# Patient Record
Sex: FEMALE
[De-identification: names, ages, dates, MRNs, and addresses within clinical notes are randomized per-mention and may not be internally consistent; named-entity substitution may affect disease eponyms.]

---

## 2021-01-20 PROBLEM — Z00.00 ENCOUNTER FOR PREVENTIVE HEALTH EXAMINATION: Status: ACTIVE | Noted: 2021-01-20

## 2021-01-21 ENCOUNTER — APPOINTMENT (OUTPATIENT)
Dept: SURGERY | Facility: CLINIC | Age: 76
End: 2021-01-21

## 2024-09-12 ENCOUNTER — APPOINTMENT (OUTPATIENT)
Dept: ORTHOPEDIC SURGERY | Facility: CLINIC | Age: 79
End: 2024-09-12
Payer: MEDICARE

## 2024-09-12 VITALS — BODY MASS INDEX: 24.09 KG/M2 | WEIGHT: 141.1 LBS | HEIGHT: 64.17 IN

## 2024-09-12 DIAGNOSIS — M75.42 IMPINGEMENT SYNDROME OF LEFT SHOULDER: ICD-10-CM

## 2024-09-12 DIAGNOSIS — Z87.891 PERSONAL HISTORY OF NICOTINE DEPENDENCE: ICD-10-CM

## 2024-09-12 DIAGNOSIS — M54.12 RADICULOPATHY, CERVICAL REGION: ICD-10-CM

## 2024-09-12 DIAGNOSIS — M75.22 BICIPITAL TENDINITIS, LEFT SHOULDER: ICD-10-CM

## 2024-09-12 DIAGNOSIS — I10 ESSENTIAL (PRIMARY) HYPERTENSION: ICD-10-CM

## 2024-09-12 PROCEDURE — 73010 X-RAY EXAM OF SHOULDER BLADE: CPT | Mod: 50

## 2024-09-12 PROCEDURE — 73030 X-RAY EXAM OF SHOULDER: CPT | Mod: 50

## 2024-09-12 PROCEDURE — 99204 OFFICE O/P NEW MOD 45 MIN: CPT

## 2024-09-12 PROCEDURE — 72040 X-RAY EXAM NECK SPINE 2-3 VW: CPT

## 2024-09-12 RX ORDER — MELOXICAM 7.5 MG/1
7.5 TABLET ORAL
Qty: 30 | Refills: 0 | Status: ACTIVE | COMMUNITY
Start: 2024-09-12 | End: 1900-01-01

## 2024-09-12 NOTE — HISTORY OF PRESENT ILLNESS
[de-identified] : Date of Injury/Onset: August 2024 Pain: At Rest: 5 With Activity: 7 Mechanism of injury: This is NOT a Work Related Injury being treated under Worker's Compensation. This is NOT an athletic injury occurring associated with an interscholastic or organized sports team. Quality of symptoms: Improves with: Worse with: Prior treatment:  Prior Imaging: MRI Reports Available For Review Today: no Out of work/sport: working School/Sport/Position/Occupation: [employer]  09/12/2024 MICHAEL 79 year F is here today for evaluation of B/L shoulder pain. Patient reports injury about 1 month ago, she missed a step and fell down landing on hands. Patient states she had been feeling b/l shoulders & hips pain since 2020 after COVID. Patient had MRI done of C-spine, T-spine and L-spine, no access to it. Patient reports pain is worse when lifting, reports limited ROM. Patient had been doing massages for pain relief. Patient notes pain radiates to b/l hands with some numbness and tingling. Patient reports some soreness and tenderness pain on shoulders.  Patient also reports b/l knees pain, reports some soreness and swelling, pain is localized, pain is worse when bending and walking.

## 2024-09-12 NOTE — DATA REVIEWED
[FreeTextEntry1] : bilateral X-Ray Examination of the SHOULDER (2 views):  no fractures, subluxations or dislocations.   X-Ray Examination of the SCAPULA 1 or 2 views shows: no significant abnormalities  X-Ray Examination of the CERVICAL SPINE 3 views (or less) shows: straightening consistent with spasm and disc space narrowing.

## 2024-09-12 NOTE — DISCUSSION/SUMMARY
[de-identified] : We discussed their diagnosis and treatment options at length. We will first attempt conservative treatment with a course of PT and anti-inflammatory medication. The patient was provided with a prescription to work on scapular strengthening and rotator cuff strengthening on the impingement syndrome protocol. We also discussed the possible of a corticosteroid injection in the future in order to help decrease inflammation and pain so that they can perform better therapy.    Follow up in 6 weeks to re-evaluate progress with therapy  Re Cervical Spine: FU with Dr. Allen/ José Miguel   next visit: if no improvement consider MR of more symptomatic shoulder

## 2024-09-12 NOTE — IMAGING
[de-identified] : NECK:  Inspection: no ecchymosis.   Palpation: trapezial tenderness.   Range of motion:  Full range of motion with mild stiffness . Pain at extremes of rotation to left.   Strength Testing: Weakness with Left Finger Abductors and Grasp  Normal Deltoid, Biceps, Triceps, Wrist Flexors  Neurological testing: light touch is intact throughout both upper extremities  Walker reflex: neg  Spurling test: positive  LEFT SHOULDER  Inspection: No swelling.   Palpation: Tenderness is noted at the bicipital groove, anterior and lateral.   Range of motion: There is pain with range of motion.  , ER 55, @90ER 90, @90IR 30  Strength: There is pain and discomfort with strength testing.  Forward Flexion 4/5. Abduction 4/5.  External Rotation 5-/5 and Internal Rotation 5/5   Neurological testings: motor and sensor intact distally.  Ligament Stability and Special Tests:   There is positive arc of pain.   Shoulder apprehension: neg  Shoulder relocation: neg  Obriens test: pos  Biceps Active test: neg  Ulrich Labral Shear: neg  Impingement testing: pos  Ijeoma testing: pos  Whipple: pos  Cross Body Adduction: neg

## 2024-10-21 ENCOUNTER — APPOINTMENT (OUTPATIENT)
Dept: ORTHOPEDIC SURGERY | Facility: CLINIC | Age: 79
End: 2024-10-21

## 2024-10-21 ENCOUNTER — RESULT CHARGE (OUTPATIENT)
Age: 79
End: 2024-10-21

## 2024-10-21 DIAGNOSIS — M75.22 BICIPITAL TENDINITIS, LEFT SHOULDER: ICD-10-CM

## 2024-10-21 DIAGNOSIS — M75.42 IMPINGEMENT SYNDROME OF LEFT SHOULDER: ICD-10-CM

## 2024-10-21 PROBLEM — M47.816 LUMBAR SPONDYLOSIS: Status: ACTIVE | Noted: 2024-10-21

## 2024-10-21 PROBLEM — M16.10 HIP ARTHRITIS: Status: ACTIVE | Noted: 2024-10-21

## 2024-10-21 PROCEDURE — 72170 X-RAY EXAM OF PELVIS: CPT

## 2024-10-21 PROCEDURE — 72100 X-RAY EXAM L-S SPINE 2/3 VWS: CPT

## 2024-10-21 RX ORDER — MELOXICAM 7.5 MG/1
7.5 TABLET ORAL
Qty: 30 | Refills: 0 | Status: ACTIVE | COMMUNITY
Start: 2024-10-21 | End: 1900-01-01

## 2024-10-30 ENCOUNTER — APPOINTMENT (OUTPATIENT)
Dept: ORTHOPEDIC SURGERY | Facility: CLINIC | Age: 79
End: 2024-10-30
Payer: MEDICARE

## 2024-10-30 DIAGNOSIS — M54.12 RADICULOPATHY, CERVICAL REGION: ICD-10-CM

## 2024-10-30 DIAGNOSIS — M47.816 SPONDYLOSIS W/OUT MYELOPATHY OR RADICULOPATHY, LUMBAR REGION: ICD-10-CM

## 2024-10-30 DIAGNOSIS — M16.10 UNILATERAL PRIMARY OSTEOARTHRITIS, UNSPECIFIED HIP: ICD-10-CM

## 2024-10-30 PROCEDURE — 99203 OFFICE O/P NEW LOW 30 MIN: CPT

## 2024-10-31 ENCOUNTER — APPOINTMENT (OUTPATIENT)
Dept: PAIN MANAGEMENT | Facility: CLINIC | Age: 79
End: 2024-10-31

## 2024-11-18 ENCOUNTER — APPOINTMENT (OUTPATIENT)
Dept: ORTHOPEDIC SURGERY | Facility: CLINIC | Age: 79
End: 2024-11-18
Payer: MEDICARE

## 2024-11-18 DIAGNOSIS — M75.22 BICIPITAL TENDINITIS, LEFT SHOULDER: ICD-10-CM

## 2024-11-18 DIAGNOSIS — M75.42 IMPINGEMENT SYNDROME OF LEFT SHOULDER: ICD-10-CM

## 2024-11-18 PROCEDURE — 99213 OFFICE O/P EST LOW 20 MIN: CPT

## 2024-11-18 RX ORDER — DICLOFENAC SODIUM 25 MG/1
25 TABLET, DELAYED RELEASE ORAL
Qty: 60 | Refills: 0 | Status: ACTIVE | COMMUNITY
Start: 2024-11-18 | End: 1900-01-01

## 2024-11-25 ENCOUNTER — APPOINTMENT (OUTPATIENT)
Dept: PAIN MANAGEMENT | Facility: CLINIC | Age: 79
End: 2024-11-25
Payer: MEDICARE

## 2024-11-25 VITALS — HEIGHT: 64 IN | WEIGHT: 142 LBS | BODY MASS INDEX: 24.24 KG/M2

## 2024-11-25 DIAGNOSIS — M54.12 RADICULOPATHY, CERVICAL REGION: ICD-10-CM

## 2024-11-25 DIAGNOSIS — M47.816 SPONDYLOSIS W/OUT MYELOPATHY OR RADICULOPATHY, LUMBAR REGION: ICD-10-CM

## 2024-11-25 DIAGNOSIS — Z86.39 PERSONAL HISTORY OF OTHER ENDOCRINE, NUTRITIONAL AND METABOLIC DISEASE: ICD-10-CM

## 2024-11-25 DIAGNOSIS — M51.16 INTERVERTEBRAL DISC DISORDERS WITH RADICULOPATHY, LUMBAR REGION: ICD-10-CM

## 2024-11-25 PROCEDURE — 99204 OFFICE O/P NEW MOD 45 MIN: CPT

## 2024-12-02 ENCOUNTER — APPOINTMENT (OUTPATIENT)
Dept: MRI IMAGING | Facility: CLINIC | Age: 79
End: 2024-12-02
Payer: MEDICARE

## 2024-12-02 PROCEDURE — 72148 MRI LUMBAR SPINE W/O DYE: CPT

## 2024-12-23 ENCOUNTER — APPOINTMENT (OUTPATIENT)
Dept: PAIN MANAGEMENT | Facility: CLINIC | Age: 79
End: 2024-12-23
Payer: MEDICARE

## 2024-12-23 VITALS — HEIGHT: 64 IN | BODY MASS INDEX: 24.59 KG/M2 | WEIGHT: 144 LBS

## 2024-12-23 DIAGNOSIS — M54.12 RADICULOPATHY, CERVICAL REGION: ICD-10-CM

## 2024-12-23 DIAGNOSIS — M51.16 INTERVERTEBRAL DISC DISORDERS WITH RADICULOPATHY, LUMBAR REGION: ICD-10-CM

## 2024-12-23 PROCEDURE — 99214 OFFICE O/P EST MOD 30 MIN: CPT

## 2025-01-02 ENCOUNTER — APPOINTMENT (OUTPATIENT)
Dept: ORTHOPEDIC SURGERY | Facility: CLINIC | Age: 80
End: 2025-01-02
Payer: MEDICARE

## 2025-01-02 DIAGNOSIS — M75.22 BICIPITAL TENDINITIS, LEFT SHOULDER: ICD-10-CM

## 2025-01-02 DIAGNOSIS — M75.42 IMPINGEMENT SYNDROME OF LEFT SHOULDER: ICD-10-CM

## 2025-01-02 PROCEDURE — J3490M: CUSTOM | Mod: JZ

## 2025-01-02 PROCEDURE — 99213 OFFICE O/P EST LOW 20 MIN: CPT | Mod: 25

## 2025-01-02 PROCEDURE — 20610 DRAIN/INJ JOINT/BURSA W/O US: CPT | Mod: LT

## 2025-01-14 ENCOUNTER — APPOINTMENT (OUTPATIENT)
Dept: PAIN MANAGEMENT | Facility: CLINIC | Age: 80
End: 2025-01-14
Payer: MEDICARE

## 2025-01-14 DIAGNOSIS — M51.16 INTERVERTEBRAL DISC DISORDERS WITH RADICULOPATHY, LUMBAR REGION: ICD-10-CM

## 2025-01-14 PROCEDURE — 62323 NJX INTERLAMINAR LMBR/SAC: CPT

## 2025-01-27 ENCOUNTER — APPOINTMENT (OUTPATIENT)
Dept: PAIN MANAGEMENT | Facility: CLINIC | Age: 80
End: 2025-01-27
Payer: MEDICARE

## 2025-01-27 VITALS — HEIGHT: 64 IN | WEIGHT: 145 LBS | BODY MASS INDEX: 24.75 KG/M2

## 2025-01-27 DIAGNOSIS — M47.816 SPONDYLOSIS W/OUT MYELOPATHY OR RADICULOPATHY, LUMBAR REGION: ICD-10-CM

## 2025-01-27 DIAGNOSIS — M51.16 INTERVERTEBRAL DISC DISORDERS WITH RADICULOPATHY, LUMBAR REGION: ICD-10-CM

## 2025-01-27 DIAGNOSIS — M46.1 SACROILIITIS, NOT ELSEWHERE CLASSIFIED: ICD-10-CM

## 2025-01-27 PROCEDURE — 99214 OFFICE O/P EST MOD 30 MIN: CPT

## 2025-02-03 ENCOUNTER — APPOINTMENT (OUTPATIENT)
Dept: ORTHOPEDIC SURGERY | Facility: CLINIC | Age: 80
End: 2025-02-03
Payer: MEDICARE

## 2025-02-03 DIAGNOSIS — M75.22 BICIPITAL TENDINITIS, LEFT SHOULDER: ICD-10-CM

## 2025-02-03 DIAGNOSIS — M75.42 IMPINGEMENT SYNDROME OF LEFT SHOULDER: ICD-10-CM

## 2025-02-03 PROCEDURE — 99212 OFFICE O/P EST SF 10 MIN: CPT

## 2025-02-12 ENCOUNTER — APPOINTMENT (OUTPATIENT)
Dept: ORTHOPEDIC SURGERY | Facility: CLINIC | Age: 80
End: 2025-02-12
Payer: MEDICARE

## 2025-02-12 PROCEDURE — 99214 OFFICE O/P EST MOD 30 MIN: CPT

## 2025-02-12 RX ORDER — METHYLPREDNISOLONE 4 MG/1
4 TABLET ORAL
Qty: 1 | Refills: 0 | Status: ACTIVE | COMMUNITY
Start: 2025-02-12 | End: 1900-01-01

## 2025-02-17 ENCOUNTER — APPOINTMENT (OUTPATIENT)
Dept: PAIN MANAGEMENT | Facility: CLINIC | Age: 80
End: 2025-02-17
Payer: MEDICARE

## 2025-02-17 VITALS — HEIGHT: 64 IN | BODY MASS INDEX: 24.59 KG/M2 | WEIGHT: 144 LBS

## 2025-02-17 DIAGNOSIS — M46.1 SACROILIITIS, NOT ELSEWHERE CLASSIFIED: ICD-10-CM

## 2025-02-17 DIAGNOSIS — M47.816 SPONDYLOSIS W/OUT MYELOPATHY OR RADICULOPATHY, LUMBAR REGION: ICD-10-CM

## 2025-02-17 DIAGNOSIS — M54.12 RADICULOPATHY, CERVICAL REGION: ICD-10-CM

## 2025-02-17 DIAGNOSIS — M51.16 INTERVERTEBRAL DISC DISORDERS WITH RADICULOPATHY, LUMBAR REGION: ICD-10-CM

## 2025-02-17 PROCEDURE — 99214 OFFICE O/P EST MOD 30 MIN: CPT

## 2025-07-17 ENCOUNTER — EMERGENCY (EMERGENCY)
Facility: HOSPITAL | Age: 80
LOS: 1 days | End: 2025-07-17
Attending: EMERGENCY MEDICINE
Payer: SELF-PAY

## 2025-07-17 VITALS
HEIGHT: 61 IN | TEMPERATURE: 98 F | HEART RATE: 70 BPM | SYSTOLIC BLOOD PRESSURE: 161 MMHG | OXYGEN SATURATION: 95 % | WEIGHT: 143.96 LBS | RESPIRATION RATE: 18 BRPM | DIASTOLIC BLOOD PRESSURE: 106 MMHG

## 2025-07-17 VITALS
SYSTOLIC BLOOD PRESSURE: 144 MMHG | OXYGEN SATURATION: 99 % | TEMPERATURE: 98 F | HEART RATE: 75 BPM | DIASTOLIC BLOOD PRESSURE: 78 MMHG | RESPIRATION RATE: 17 BRPM

## 2025-07-17 PROCEDURE — 73090 X-RAY EXAM OF FOREARM: CPT

## 2025-07-17 PROCEDURE — 96376 TX/PRO/DX INJ SAME DRUG ADON: CPT

## 2025-07-17 PROCEDURE — 90715 TDAP VACCINE 7 YRS/> IM: CPT

## 2025-07-17 PROCEDURE — 73060 X-RAY EXAM OF HUMERUS: CPT | Mod: 26,RT

## 2025-07-17 PROCEDURE — 73030 X-RAY EXAM OF SHOULDER: CPT

## 2025-07-17 PROCEDURE — 96375 TX/PRO/DX INJ NEW DRUG ADDON: CPT

## 2025-07-17 PROCEDURE — 73090 X-RAY EXAM OF FOREARM: CPT | Mod: 26,LT

## 2025-07-17 PROCEDURE — 70450 CT HEAD/BRAIN W/O DYE: CPT | Mod: 26

## 2025-07-17 PROCEDURE — 73060 X-RAY EXAM OF HUMERUS: CPT

## 2025-07-17 PROCEDURE — 73030 X-RAY EXAM OF SHOULDER: CPT | Mod: 26,RT

## 2025-07-17 PROCEDURE — 99284 EMERGENCY DEPT VISIT MOD MDM: CPT | Mod: 25

## 2025-07-17 PROCEDURE — 73060 X-RAY EXAM OF HUMERUS: CPT | Mod: 26,RT,77

## 2025-07-17 PROCEDURE — 73080 X-RAY EXAM OF ELBOW: CPT

## 2025-07-17 PROCEDURE — 96374 THER/PROPH/DIAG INJ IV PUSH: CPT

## 2025-07-17 PROCEDURE — 70450 CT HEAD/BRAIN W/O DYE: CPT

## 2025-07-17 PROCEDURE — 73080 X-RAY EXAM OF ELBOW: CPT | Mod: 26,RT

## 2025-07-17 PROCEDURE — 99285 EMERGENCY DEPT VISIT HI MDM: CPT

## 2025-07-17 PROCEDURE — 90471 IMMUNIZATION ADMIN: CPT

## 2025-07-17 PROCEDURE — 73110 X-RAY EXAM OF WRIST: CPT | Mod: 26,RT

## 2025-07-17 PROCEDURE — 73110 X-RAY EXAM OF WRIST: CPT

## 2025-07-17 RX ORDER — OXYCODONE HYDROCHLORIDE 30 MG/1
1 TABLET ORAL
Qty: 4 | Refills: 0
Start: 2025-07-17 | End: 2025-07-19

## 2025-07-17 RX ORDER — OXYCODONE HYDROCHLORIDE 30 MG/1
5 TABLET ORAL ONCE
Refills: 0 | Status: DISCONTINUED | OUTPATIENT
Start: 2025-07-17 | End: 2025-07-17

## 2025-07-17 RX ORDER — KETOROLAC TROMETHAMINE 30 MG/ML
15 INJECTION, SOLUTION INTRAMUSCULAR; INTRAVENOUS ONCE
Refills: 0 | Status: DISCONTINUED | OUTPATIENT
Start: 2025-07-17 | End: 2025-07-17

## 2025-07-17 RX ORDER — ACETAMINOPHEN 500 MG/5ML
1000 LIQUID (ML) ORAL ONCE
Refills: 0 | Status: COMPLETED | OUTPATIENT
Start: 2025-07-17 | End: 2025-07-17

## 2025-07-17 RX ADMIN — Medication 1000 MILLILITER(S): at 16:05

## 2025-07-17 RX ADMIN — Medication 2 MILLIGRAM(S): at 11:17

## 2025-07-17 RX ADMIN — OXYCODONE HYDROCHLORIDE 5 MILLIGRAM(S): 30 TABLET ORAL at 16:05

## 2025-07-17 RX ADMIN — KETOROLAC TROMETHAMINE 15 MILLIGRAM(S): 30 INJECTION, SOLUTION INTRAMUSCULAR; INTRAVENOUS at 11:17

## 2025-07-17 RX ADMIN — Medication 4 MILLIGRAM(S): at 15:06

## 2025-07-17 RX ADMIN — Medication 400 MILLIGRAM(S): at 11:17

## 2025-07-18 ENCOUNTER — EMERGENCY (EMERGENCY)
Facility: HOSPITAL | Age: 80
LOS: 1 days | End: 2025-07-18
Attending: STUDENT IN AN ORGANIZED HEALTH CARE EDUCATION/TRAINING PROGRAM
Payer: OTHER MISCELLANEOUS

## 2025-07-18 VITALS
TEMPERATURE: 98 F | HEIGHT: 61 IN | DIASTOLIC BLOOD PRESSURE: 70 MMHG | SYSTOLIC BLOOD PRESSURE: 118 MMHG | OXYGEN SATURATION: 95 % | HEART RATE: 78 BPM | RESPIRATION RATE: 17 BRPM | WEIGHT: 143.96 LBS

## 2025-07-18 PROCEDURE — 99284 EMERGENCY DEPT VISIT MOD MDM: CPT

## 2025-07-18 NOTE — ED PROVIDER NOTE - PROGRESS NOTE DETAILS
NP Adela: Ortho came to bedside to apply new splint to patient's arm.  Per Ortho patient is cleared and ready for discharge, please have patient follow-up with Ji Pathak in 1 week.  Patient provided strict return precautions, provided time to ask questions which were answered at the bedside by me.  Patient is ready for discharge home. referred patient to ortho for 1 week f/u. discussed patient home meds and no interaction w/ oxycodone. discussed to not take ibuprofen w/ meloxicam or advil. discussed strict return precautions. patient and son agreeable to plan

## 2025-07-18 NOTE — ED ADULT TRIAGE NOTE - NS ED TRIAGE AVPU SCALE
Updating BP     Last Blood Pressure: 158/80  Last Heart Rate: 60  Date: 2/27/23  Location: Other Specialty      3/21/23 Blood Pressure: 136/75  Heart Rate: n/a   Location: Home BP    Patient reported blood pressure updated in Epic. Blood pressure falls within [place] Community Measures guidelines.  Patient will follow up as previously advised.   EMILY Hinton     
Alert-The patient is alert, awake and responds to voice. The patient is oriented to time, place, and person. The triage nurse is able to obtain subjective information.

## 2025-07-18 NOTE — ED PROVIDER NOTE - ATTENDING APP SHARED VISIT CONTRIBUTION OF CARE
79-year-old female presents the ER as callback requesting to be seen by orthopedic doctors, patient recently seen in the ED by myself for fall found to have humeral fracture which was splinted in the ER by orthopedic team.  Upon review of imaging patient was found to have radial head fracture as well as humerus fracture. cap refill within normal limits, no decreased sensation, cast in place will discuss with orthopedic team for resplinting, multimodal pain medication.  Dispo pending workup    I performed a history and physical exam of the patient and discussed their management with the resident. I reviewed the acp/ resident's note and agree with the documented findings and plan of care, except as noted.. My medical decision making and observations are found above. I agree with the documentation and assessment as made by the NP. We have discussed plan of care and work up for the patient.   This was a shared visit with the NP. I independently reviewed and verified the documentation and directly supervised and/or performed the documented:   History, Exam and Medical Decision Making.     79-year-old female presents the ER as callback requesting to be seen by orthopedic doctors, patient recently seen in the ED for fall found to have humeral fracture which was splinted in the ER by orthopedic team.  Upon review of imaging patient was found to have radial head fracture as well as humerus fracture. cap refill within normal limits, no decreased sensation, cast in place will discuss with orthopedic team for resplinting, multimodal pain medication.  Dispo pending workup. - Kuldip Luke MD. EM Attending

## 2025-07-18 NOTE — ED PROVIDER NOTE - CLINICAL SUMMARY MEDICAL DECISION MAKING FREE TEXT BOX
EVY Chapman: Patient is 79-year-old female presents to the ER as callback to see orthopedic doctors.  Per report patient was seen here yesterday after a fall, was found to have a humeral fracture, patient was splinted here in ER by the orthopedic team, upon review of x-rays last night patient was also found to have a radial head fracture, Ortho states by the time they saw this patient was already gone, they called patient and she said she would come back today.  Patient denies new complaints.  Patient is well-appearing, nontoxic.  PE as above pertinent for RUE with splint in place from elbow to shoulder, radial pulse 2+, compartments soft, capillary refill < 2 seconds.  Patient likely with fracture requiring splinting by orthopedic team.  Orthopedic team called, to come to bedside to evaluate and splint patient.  Disposition pending reassessment and orthopedic recommendations.

## 2025-07-18 NOTE — ED PROVIDER NOTE - PHYSICAL EXAMINATION
CONSTITUTIONAL: A&O x3, NAD, resting comfortably, well groomed  HEAD: Normocephalic, atraumatic.   NECK:  Airway patent. Neck Supple.    CARDIAC: RRR, normal S1/2, no murmurs, rubs, gallops. CW non-TTP, no CW deformity.  RESPIRATORY: breathing unlabored.   MSK: RUE with splint in place from elbow to shoulder, radial pulse 2+, compartments soft, capillary refill < 2 seconds.   SKIN: Warm, dry, color WNL, no turgor, erythema or rashes. Cap refill < 2 sec.  NEURO: A&Ox3, interactive, cooperative, no focal deficits.

## 2025-07-18 NOTE — ED PROVIDER NOTE - CARE PROVIDER_API CALL
Ji Pathak)  Orthopaedic Surgery  [address] [place] [address], [address] [place] [address]  Phone: [phone]  Fax: [phone]  Follow Up Time: 4-6 Days

## 2025-07-18 NOTE — ED PROVIDER NOTE - NSFOLLOWUPINSTRUCTIONS_ED_ALL_ED_FT
- You were seen in the ER today for fracture requiring splinting by the orthopedic team.  The splint that was placed by the orthopedic team cannot get wet, please keep the splint in place until you follow-up with the orthopedic doctors next week.    - Take Motrin 600 mg every 8 hours as needed for moderate pain-- take with food..    - Take Tylenol 650mg (Two 325 mg pills) every 4-6 hours as needed for pain.    - You can take oxycodone which is previously prescribed for you every 6-8 hours as needed for severe pain.  Please do not drink alcohol or drive on this medication as it may cause drowsiness.    - Rest, Ice, Elevate injured area    - Advance activity as tolerated.     - Continue all previously prescribed medications as directed unless otherwise instructed.     - Follow-up with Orthopedics Dr Ji Pathak Next week as discussed, Contact information will be provided below for you.    -Return to Emergency room for any worsening or persistent pain, weakness, numbness, fever, color change to extremity, or any other concerning symptoms.

## 2025-07-18 NOTE — ED ADULT NURSE NOTE - NSFALLUNIVINTERV_ED_ALL_ED
Bed/Stretcher in lowest position, wheels locked, appropriate side rails in place/Call bell, personal items and telephone in reach/Instruct patient to call for assistance before getting out of bed/chair/stretcher/Non-slip footwear applied when patient is off stretcher/[place] to call system/Physically safe environment - no spills, clutter or unnecessary equipment/Purposeful proactive rounding/Room/bathroom lighting operational, light cord in reach

## 2025-07-18 NOTE — ED PROVIDER NOTE - PATIENT PORTAL LINK FT
You can access the FollowMyHealth Patient Portal offered by [facility] by registering at the following website: [email or web address]. By joining [employer]’s FollowMyHealth portal, you will also be able to view your health information using other applications (apps) compatible with our system.

## 2025-07-23 ENCOUNTER — EMERGENCY (EMERGENCY)
Facility: HOSPITAL | Age: 80
LOS: 1 days | End: 2025-07-23
Attending: STUDENT IN AN ORGANIZED HEALTH CARE EDUCATION/TRAINING PROGRAM
Payer: MEDICARE

## 2025-07-23 VITALS
WEIGHT: 149.91 LBS | OXYGEN SATURATION: 97 % | HEART RATE: 80 BPM | RESPIRATION RATE: 17 BRPM | SYSTOLIC BLOOD PRESSURE: 113 MMHG | DIASTOLIC BLOOD PRESSURE: 71 MMHG | HEIGHT: 61 IN | TEMPERATURE: 98 F

## 2025-07-23 PROCEDURE — 99284 EMERGENCY DEPT VISIT MOD MDM: CPT

## 2025-07-23 PROCEDURE — 99283 EMERGENCY DEPT VISIT LOW MDM: CPT | Mod: 25

## 2025-07-23 PROCEDURE — 73060 X-RAY EXAM OF HUMERUS: CPT | Mod: 26,RT

## 2025-07-23 PROCEDURE — 73060 X-RAY EXAM OF HUMERUS: CPT

## 2025-07-23 RX ORDER — OXYCODONE HYDROCHLORIDE AND ACETAMINOPHEN 10; 325 MG/1; MG/1
1 TABLET ORAL ONCE
Refills: 0 | Status: DISCONTINUED | OUTPATIENT
Start: 2025-07-23 | End: 2025-07-23

## 2025-07-23 RX ORDER — OXYCODONE HYDROCHLORIDE 30 MG/1
1 TABLET ORAL
Qty: 8 | Refills: 0
Start: 2025-07-23 | End: 2025-07-24

## 2025-07-23 RX ORDER — DOCUSATE SODIUM 100 MG
1 CAPSULE ORAL
Qty: 10 | Refills: 0
Start: 2025-07-23 | End: 2025-07-27

## 2025-07-23 RX ADMIN — OXYCODONE HYDROCHLORIDE AND ACETAMINOPHEN 1 TABLET(S): 10; 325 TABLET ORAL at 13:24

## 2025-07-23 RX ADMIN — OXYCODONE HYDROCHLORIDE AND ACETAMINOPHEN 1 TABLET(S): 10; 325 TABLET ORAL at 14:00

## 2025-07-23 NOTE — ED PROVIDER NOTE - OBJECTIVE STATEMENT
80-year-old female, recent history of a right proximal humerus, midshaft humerus  and distal radius fracture evaluated at [facility] ER.  Was evaluated in the emergency room 5 days ago and had a coaptation splint applied and eventually called back to return to the ER for sugar-tong splint.   The time was eval by Ortho as well. Patient was prescribed a few tablets of oxycodone once taking ibuprofen/Tylenol.  Patient presents today with son for appointment with trauma surgeon Dr. Morocho.  Reports pain is continuous but no increase in pain.  Reports at times feeling slight numbness sensation all fingers.  No other complaints.

## 2025-07-23 NOTE — ED PROVIDER NOTE - CLINICAL SUMMARY MEDICAL DECISION MAKING FREE TEXT BOX
80-year-old female, presents for evaluation of persistent pain and inability to follow-up with the surgeon.  During my exam and during the orthopedic PA team exam the upper extremity is neurovascularly intact.  No signs of open fracture, compartment syndrome or infection.  Splint reapplied by the orthopedic team.   Plan for outpatient trauma Ortho follow-up.

## 2025-07-23 NOTE — ED PROVIDER NOTE - ATTENDING APP SHARED VISIT CONTRIBUTION OF CARE
80-year-old female recent history of right proximal humerus, midshaft humerus, distal radius fracture presents with persistent pain, inability to follow-up with orthopedic as outpatient.  Patient was initially seen in the emergency department by myself 5 days ago, had a coaptation splint applied by orthopedics, was called back later and placed in a sugar-tong splint.  Patient presents today with increased pain, inability follow-up with orthopedics.  Physical exam positive peripheral pulses, cap refill less than 2 seconds, sensation to all fingers, mild ecchymosis to upper extremity.  Ortho consulted for reevaluation, ordered rpt XR, Ortho resplinted patient, discussed return precautions, will give patient follow-up with Ortho trauma as outpatient.  Reviewed patient's chronic medication list with patient, no interaction with oxy, Tylenol.  Patient on meloxicam, discussed with patient need to not take meloxicam if taking ibuprofen and Motrin.    I performed a history and physical exam of the patient and discussed their management with the resident. I reviewed the acp/ resident's note and agree with the documented findings and plan of care, except as noted.. My medical decision making and observations are found above.

## 2025-07-23 NOTE — ED ADULT NURSE NOTE - OBJECTIVE STATEMENT
pt from home c/o Rt forearm, Rt wrist pain s/p fall on 7/17 and  Rt arm cast placement, Rt arm cast intact

## 2025-07-23 NOTE — ED PROVIDER NOTE - PHYSICAL EXAMINATION
Both splints removed for exam: Mild ecchymosis to the upper extremity.  No skin break.  Radial/ulnar pulses intact 2+ bilaterally.  Cap refill less than 2 seconds all fingers.  No sensory deficit on exam.  Able to dorsiflex the wrist, abduct the fingers and appose index to thumb.

## 2025-07-23 NOTE — ED ADULT NURSE NOTE - NSFALLRISKINTERV_ED_ALL_ED

## 2025-07-23 NOTE — ED PROVIDER NOTE - PROVIDER TOKENS
PROVIDER:[TOKEN:[[id number]]] PROVIDER:[TOKEN:[[id number]]],PROVIDER:[TOKEN:[[id number]]],PROVIDER:[TOKEN:[[id number]]],PROVIDER:[TOKEN:[[id number]]]

## 2025-07-23 NOTE — ED PROVIDER NOTE - NSFOLLOWUPINSTRUCTIONS_ED_ALL_ED_FT
Follow up with the trauma orthopedist within 1 week.  If you experience any new or worsening symptoms or if you are concerned you can always come back to the emergency for a re-evaluation.  Some results may not be available at the time of your discharge from the hospital. You can download the FOLLOW MY HEALTH jamarcus and have access to these results.  If there were any prescriptions given to you during the visit today take them as prescribed. If you have any questions you can ask the pharmacist.

## 2025-07-23 NOTE — ED PROVIDER NOTE - CARE PROVIDER_API CALL
Edenilson Arana)  Orthopaedic Surgery  [address], [address]  [place] [address]  Phone: [phone]  Fax: [phone]  Follow Up Time:    Edenilson Arana ()  Orthopaedic Surgery  [address], [address]  [place] [address]  Phone: [phone]  Fax: [phone]  Follow Up Time:     Agustina Rocha ()  Orthopaedic Surgery  [address], [address]  [place] [address]  Phone: [phone]  Fax: [phone]  Follow Up Time:     Eddy Barraza)  Orthopaedic Trauma  [address], [address]  [place] [address]  Phone: [phone]  Fax: [phone]  Follow Up Time:     Aleksey Short)  Orthopaedic Surgery  [address], [address]  [place] [address]  Phone: [phone]  Fax: [phone]  Follow Up Time:

## 2025-07-23 NOTE — ED ADULT NURSE NOTE - CAS DISCH TRANSFER METHOD
Cardiopulmonary Rehab:      Chart reviewed. Pt is on CHF Bundle List      Pt is a 81 y.o. female admitted with Acute respiratory failure with hypoxia. PMHx of HTN, PAD, COPD, GERD, TIA, anxiety. Former smoker. LVEF 55-60%. Pt last visited for CHF teaching [facility] cardiac rehab nurse at recent hospitalization at 5/8/18. Met with pt sitting up in chair, legs elevated. No family at bedside. Pt visited. This was a follow-up visit to answer questions and reinforce prior teaching re: CHF, S&Ss, medication management, Low NA diet, daily weights and when to call the doctor. Pr resides in [facility]. Reports the staff weighs her occasionally. Pt correctly identified rationale for daily weights and reports they do it when they get a chance. Reminded to self advocate. Pt correctly identified SOB and swelling as symptoms of fluid overload. Reminded her to let the staff at the SNF know if she feels more fatigued than usual and/or has difficulty laying down flat. Private car

## 2025-07-23 NOTE — CHART NOTE - NSCHARTNOTEFT_GEN_A_CORE
Patient initially seen one week ago 7/17 s/p fall with RUE fracture; proximal humerus, humeral shaft, and distal radius  Patient was seen and placed in a coaptation splint and sling.  Patient then returned to the ED the following day for pain and splint evaluation , patient was then placed in posterior long arm splint with sugar tong  Patient was told to follow up with Dr Pathak in office trauma specialist  Patient returned to the ED today with her son due to being unable to make appointment for follow up  Patient splint changed today to new posterior long arm splint     >  post-procedure patient had decreased pain and goo dpuses 2+ and CR <2 , sensation and motor intact completely  Patient to follow up with Dr. Jones or Dr. Short trauma specialists as Dr. Pathak is not taking patients at this time  Explained in detail to patient and her son they need to follow up with trauma specialist for possible surgical intervention for RUE fractures  Patient is orthopedically stable for discharge

## 2025-07-23 NOTE — ED PROVIDER NOTE - CARE PLAN
1 Principal Discharge DX:	Closed fracture of right humerus  Secondary Diagnosis:	Closed right radial fracture

## 2025-07-23 NOTE — ED PROVIDER NOTE - CARE PROVIDERS DIRECT ADDRESSES
,[email or web address]

## 2025-07-23 NOTE — ED PROVIDER NOTE - PATIENT PORTAL LINK FT
You can access the FollowMyHealth Patient Portal offered by [facility] by registering at the following website: [email or web address]. By joining [employer]’s FollowMyHealth portal, you will also be able to view your health information using other applications (apps) compatible with our system.

## 2025-07-23 NOTE — ED ADULT TRIAGE NOTE - CHIEF COMPLAINT QUOTE
reports constant pain to right forearm and right wrist s/p trip and fall 7/17. Patient was diagnosed with fracture and placed in a cast.

## 2025-07-30 ENCOUNTER — NON-APPOINTMENT (OUTPATIENT)
Age: 80
End: 2025-07-30

## 2025-07-31 ENCOUNTER — APPOINTMENT (OUTPATIENT)
Dept: ORTHOPEDIC SURGERY | Facility: CLINIC | Age: 80
End: 2025-07-31

## 2025-07-31 VITALS — BODY MASS INDEX: 25.52 KG/M2 | HEIGHT: 63 IN | WEIGHT: 144 LBS

## 2025-07-31 PROCEDURE — 99204 OFFICE O/P NEW MOD 45 MIN: CPT

## 2025-07-31 PROCEDURE — 25600 CLTX DST RDL FX/EPHYS SEP WO: CPT | Mod: RT

## 2025-07-31 PROCEDURE — 24500 CLTX HUMRL SHFT FX W/O MNPJ: CPT | Mod: RT

## 2025-07-31 PROCEDURE — 23600 CLTX PROX HUMRL FX W/O MNPJ: CPT | Mod: RT

## 2025-07-31 RX ORDER — MELOXICAM 7.5 MG/1
7.5 TABLET ORAL
Qty: 30 | Refills: 2 | Status: ACTIVE | COMMUNITY
Start: 2025-07-31 | End: 1900-01-01

## 2025-08-07 ENCOUNTER — APPOINTMENT (OUTPATIENT)
Dept: ORTHOPEDIC SURGERY | Facility: CLINIC | Age: 80
End: 2025-08-07

## 2025-08-08 ENCOUNTER — OUTPATIENT (OUTPATIENT)
Dept: OUTPATIENT SERVICES | Facility: HOSPITAL | Age: 80
LOS: 1 days | End: 2025-08-08
Payer: MEDICARE

## 2025-08-08 ENCOUNTER — RESULT REVIEW (OUTPATIENT)
Age: 80
End: 2025-08-08

## 2025-08-08 VITALS
DIASTOLIC BLOOD PRESSURE: 79 MMHG | HEART RATE: 87 BPM | OXYGEN SATURATION: 97 % | WEIGHT: 141.98 LBS | RESPIRATION RATE: 15 BRPM | TEMPERATURE: 98 F | HEIGHT: 62.6 IN | SYSTOLIC BLOOD PRESSURE: 120 MMHG

## 2025-08-08 DIAGNOSIS — S42.309A UNSPECIFIED FRACTURE OF SHAFT OF HUMERUS, UNSPECIFIED ARM, INITIAL ENCOUNTER FOR CLOSED FRACTURE: ICD-10-CM

## 2025-08-08 DIAGNOSIS — Z98.890 OTHER SPECIFIED POSTPROCEDURAL STATES: Chronic | ICD-10-CM

## 2025-08-08 DIAGNOSIS — Z98.49 CATARACT EXTRACTION STATUS, UNSPECIFIED EYE: Chronic | ICD-10-CM

## 2025-08-08 DIAGNOSIS — S52.501A UNSPECIFIED FRACTURE OF THE LOWER END OF RIGHT RADIUS, INITIAL ENCOUNTER FOR CLOSED FRACTURE: ICD-10-CM

## 2025-08-08 DIAGNOSIS — S42.401A UNSPECIFIED FRACTURE OF LOWER END OF RIGHT HUMERUS, INITIAL ENCOUNTER FOR CLOSED FRACTURE: ICD-10-CM

## 2025-08-08 DIAGNOSIS — S42.201A UNSPECIFIED FRACTURE OF UPPER END OF RIGHT HUMERUS, INITIAL ENCOUNTER FOR CLOSED FRACTURE: ICD-10-CM

## 2025-08-08 DIAGNOSIS — Z01.818 ENCOUNTER FOR OTHER PREPROCEDURAL EXAMINATION: ICD-10-CM

## 2025-08-08 LAB
ANION GAP SERPL CALC-SCNC: 16 MMOL/L — SIGNIFICANT CHANGE UP (ref 5–17)
BUN SERPL-MCNC: 16 MG/DL — SIGNIFICANT CHANGE UP (ref 7–23)
CALCIUM SERPL-MCNC: 10.2 MG/DL — SIGNIFICANT CHANGE UP (ref 8.4–10.5)
CHLORIDE SERPL-SCNC: 102 MMOL/L — SIGNIFICANT CHANGE UP (ref 96–108)
CO2 SERPL-SCNC: 22 MMOL/L — SIGNIFICANT CHANGE UP (ref 22–31)
CREAT SERPL-MCNC: 1.02 MG/DL — SIGNIFICANT CHANGE UP (ref 0.5–1.3)
EGFR: 56 ML/MIN/1.73M2 — LOW
EGFR: 56 ML/MIN/1.73M2 — LOW
GLUCOSE SERPL-MCNC: 106 MG/DL — HIGH (ref 70–99)
HCT VFR BLD CALC: 42.6 % — SIGNIFICANT CHANGE UP (ref 34.5–45)
HGB BLD-MCNC: 14 G/DL — SIGNIFICANT CHANGE UP (ref 11.5–15.5)
MCHC RBC-ENTMCNC: 30.2 PG — SIGNIFICANT CHANGE UP (ref 27–34)
MCHC RBC-ENTMCNC: 32.9 G/DL — SIGNIFICANT CHANGE UP (ref 32–36)
MCV RBC AUTO: 92 FL — SIGNIFICANT CHANGE UP (ref 80–100)
NRBC # BLD AUTO: 0 K/UL — SIGNIFICANT CHANGE UP (ref 0–0)
NRBC # FLD: 0 K/UL — SIGNIFICANT CHANGE UP (ref 0–0)
NRBC BLD AUTO-RTO: 0 /100 WBCS — SIGNIFICANT CHANGE UP (ref 0–0)
PLATELET # BLD AUTO: 410 K/UL — HIGH (ref 150–400)
PMV BLD: 10.5 FL — SIGNIFICANT CHANGE UP (ref 7–13)
POTASSIUM SERPL-MCNC: 3.8 MMOL/L — SIGNIFICANT CHANGE UP (ref 3.5–5.3)
POTASSIUM SERPL-SCNC: 3.8 MMOL/L — SIGNIFICANT CHANGE UP (ref 3.5–5.3)
RBC # BLD: 4.63 M/UL — SIGNIFICANT CHANGE UP (ref 3.8–5.2)
RBC # FLD: 14.6 % — HIGH (ref 10.3–14.5)
SODIUM SERPL-SCNC: 140 MMOL/L — SIGNIFICANT CHANGE UP (ref 135–145)
WBC # BLD: 8.34 K/UL — SIGNIFICANT CHANGE UP (ref 3.8–10.5)
WBC # FLD AUTO: 8.34 K/UL — SIGNIFICANT CHANGE UP (ref 3.8–10.5)

## 2025-08-08 PROCEDURE — 80048 BASIC METABOLIC PNL TOTAL CA: CPT

## 2025-08-08 PROCEDURE — G0463: CPT

## 2025-08-08 PROCEDURE — 73200 CT UPPER EXTREMITY W/O DYE: CPT | Mod: 26,RT

## 2025-08-08 PROCEDURE — 76377 3D RENDER W/INTRP POSTPROCES: CPT | Mod: 26

## 2025-08-08 PROCEDURE — 76377 3D RENDER W/INTRP POSTPROCES: CPT

## 2025-08-08 PROCEDURE — 85027 COMPLETE CBC AUTOMATED: CPT

## 2025-08-08 PROCEDURE — 73200 CT UPPER EXTREMITY W/O DYE: CPT

## 2025-08-12 ENCOUNTER — OUTPATIENT (OUTPATIENT)
Dept: INPATIENT UNIT | Facility: HOSPITAL | Age: 80
LOS: 1 days | End: 2025-08-12
Payer: MEDICARE

## 2025-08-12 ENCOUNTER — APPOINTMENT (OUTPATIENT)
Dept: ORTHOPEDIC SURGERY | Facility: HOSPITAL | Age: 80
End: 2025-08-12

## 2025-08-12 ENCOUNTER — TRANSCRIPTION ENCOUNTER (OUTPATIENT)
Age: 80
End: 2025-08-12

## 2025-08-12 VITALS
RESPIRATION RATE: 16 BRPM | HEIGHT: 62.6 IN | TEMPERATURE: 98 F | SYSTOLIC BLOOD PRESSURE: 144 MMHG | WEIGHT: 141.98 LBS | DIASTOLIC BLOOD PRESSURE: 79 MMHG | HEART RATE: 66 BPM | OXYGEN SATURATION: 95 %

## 2025-08-12 VITALS
HEART RATE: 77 BPM | OXYGEN SATURATION: 100 % | SYSTOLIC BLOOD PRESSURE: 136 MMHG | DIASTOLIC BLOOD PRESSURE: 62 MMHG | RESPIRATION RATE: 15 BRPM

## 2025-08-12 DIAGNOSIS — S42.201A UNSPECIFIED FRACTURE OF UPPER END OF RIGHT HUMERUS, INITIAL ENCOUNTER FOR CLOSED FRACTURE: ICD-10-CM

## 2025-08-12 DIAGNOSIS — Z98.890 OTHER SPECIFIED POSTPROCEDURAL STATES: Chronic | ICD-10-CM

## 2025-08-12 DIAGNOSIS — Z98.49 CATARACT EXTRACTION STATUS, UNSPECIFIED EYE: Chronic | ICD-10-CM

## 2025-08-12 DIAGNOSIS — S52.501A UNSPECIFIED FRACTURE OF THE LOWER END OF RIGHT RADIUS, INITIAL ENCOUNTER FOR CLOSED FRACTURE: ICD-10-CM

## 2025-08-12 DIAGNOSIS — S42.401A UNSPECIFIED FRACTURE OF LOWER END OF RIGHT HUMERUS, INITIAL ENCOUNTER FOR CLOSED FRACTURE: ICD-10-CM

## 2025-08-12 LAB — GLUCOSE BLDC GLUCOMTR-MCNC: 200 MG/DL — HIGH (ref 70–99)

## 2025-08-12 PROCEDURE — 82962 GLUCOSE BLOOD TEST: CPT

## 2025-08-12 PROCEDURE — C1713: CPT

## 2025-08-12 PROCEDURE — C9399: CPT

## 2025-08-12 PROCEDURE — 24515 OPTX HUMRL SHFT FX PLATE/SCR: CPT | Mod: RT

## 2025-08-12 PROCEDURE — 76000 FLUOROSCOPY <1 HR PHYS/QHP: CPT

## 2025-08-12 PROCEDURE — 23615 OPTX PROX HUMRL FX W/INT FIX: CPT | Mod: RT

## 2025-08-12 PROCEDURE — 25607 OPTX DST RD XARTC FX/EPI SEP: CPT | Mod: RT

## 2025-08-12 DEVICE — SCREW SLFTP METAPHYSEAL 2.7X40MM: Type: IMPLANTABLE DEVICE | Site: RIGHT | Status: FUNCTIONAL

## 2025-08-12 DEVICE — SCREW LOKG SLF-TPNG W/ STARDRIVE RECESS 3.5X42MM: Type: IMPLANTABLE DEVICE | Site: RIGHT | Status: FUNCTIONAL

## 2025-08-12 DEVICE — SCREW CORT S-T 3.5X22MM: Type: IMPLANTABLE DEVICE | Site: RIGHT | Status: FUNCTIONAL

## 2025-08-12 DEVICE — SCREW LOKG SLF-TPNG W/ STARDRIVE RECESS 3.5X38MM: Type: IMPLANTABLE DEVICE | Site: RIGHT | Status: FUNCTIONAL

## 2025-08-12 DEVICE — SCREW CORT 2.5X15MM: Type: IMPLANTABLE DEVICE | Site: RIGHT | Status: FUNCTIONAL

## 2025-08-12 DEVICE — SCREW CORT 2.5X14MM: Type: IMPLANTABLE DEVICE | Site: RIGHT | Status: FUNCTIONAL

## 2025-08-12 DEVICE — IMPLANTABLE DEVICE: Type: IMPLANTABLE DEVICE | Site: RIGHT | Status: FUNCTIONAL

## 2025-08-12 DEVICE — WIRE OLIVE 1.6MM 10X60MM: Type: IMPLANTABLE DEVICE | Site: RIGHT | Status: FUNCTIONAL

## 2025-08-12 DEVICE — SCREW LKG VA SLF TAP W/ T8 STARDRIVE 2.7X44MM: Type: IMPLANTABLE DEVICE | Site: RIGHT | Status: FUNCTIONAL

## 2025-08-12 DEVICE — SCREW CORT 2.5X20MM: Type: IMPLANTABLE DEVICE | Site: RIGHT | Status: FUNCTIONAL

## 2025-08-12 DEVICE — SCREW TRILOCK 2.5X18MM: Type: IMPLANTABLE DEVICE | Site: RIGHT | Status: FUNCTIONAL

## 2025-08-12 DEVICE — SCR LOK S-T 3.5X28MM SS: Type: IMPLANTABLE DEVICE | Site: RIGHT | Status: FUNCTIONAL

## 2025-08-12 DEVICE — PLATE LCP 10H 3.5X137MM: Type: IMPLANTABLE DEVICE | Site: RIGHT | Status: FUNCTIONAL

## 2025-08-12 DEVICE — SCREW LOKG SLF-TPNG W/ STARDRIVE RECESS 3.5X26MM: Type: IMPLANTABLE DEVICE | Site: RIGHT | Status: FUNCTIONAL

## 2025-08-12 DEVICE — SCREW TRILOCK 2.5X20MM: Type: IMPLANTABLE DEVICE | Site: RIGHT | Status: FUNCTIONAL

## 2025-08-12 DEVICE — SCREW CORT S-T 3.5X24MM: Type: IMPLANTABLE DEVICE | Site: RIGHT | Status: FUNCTIONAL

## 2025-08-12 DEVICE — SCREW LKG VA SLF TAP W/ T8 STARDRIVE 2.7X46MM: Type: IMPLANTABLE DEVICE | Site: RIGHT | Status: FUNCTIONAL

## 2025-08-12 DEVICE — K-WIRE MEDARTIS (SMOOTH) SINGLE TROCAR 1.6MM X 150MM: Type: IMPLANTABLE DEVICE | Site: RIGHT | Status: FUNCTIONAL

## 2025-08-12 DEVICE — SCREW LOCKING SLF-TPNG W/ STARDRIVE RECESS 3.5X40MM: Type: IMPLANTABLE DEVICE | Site: RIGHT | Status: FUNCTIONAL

## 2025-08-12 DEVICE — SCREW CORT S-T 3.5X26MM: Type: IMPLANTABLE DEVICE | Site: RIGHT | Status: FUNCTIONAL

## 2025-08-12 DEVICE — SCREW TRILOCK 2.5X14MM: Type: IMPLANTABLE DEVICE | Site: RIGHT | Status: FUNCTIONAL

## 2025-08-12 DEVICE — SCREW TRILOCK 2.5X16MM: Type: IMPLANTABLE DEVICE | Site: RIGHT | Status: FUNCTIONAL

## 2025-08-12 DEVICE — SCREW CORT S-T 3.5X28MM: Type: IMPLANTABLE DEVICE | Site: RIGHT | Status: FUNCTIONAL

## 2025-08-12 DEVICE — SCREW LOKG SLF-TPNG W/ STARDRIVE RECESS 3.5X24MM: Type: IMPLANTABLE DEVICE | Site: RIGHT | Status: FUNCTIONAL

## 2025-08-12 DEVICE — PLATE TRILOCK DIST RT 11H 2.5MM: Type: IMPLANTABLE DEVICE | Site: RIGHT | Status: FUNCTIONAL

## 2025-08-12 DEVICE — SCREW LKG VA SLF TAP W/ T8 STARDRIVE 2.7X32MM: Type: IMPLANTABLE DEVICE | Site: RIGHT | Status: FUNCTIONAL

## 2025-08-12 DEVICE — SCREW LKG VA SLF TAP W/ T8 STARDRIVE 2.7X28MM: Type: IMPLANTABLE DEVICE | Site: RIGHT | Status: FUNCTIONAL

## 2025-08-12 DEVICE — PLATE LCP 8H 3.5X111MM: Type: IMPLANTABLE DEVICE | Site: RIGHT | Status: FUNCTIONAL

## 2025-08-12 RX ORDER — LIDOCAINE HCL/PF 10 MG/ML
0.2 VIAL (ML) INJECTION ONCE
Refills: 0 | Status: DISCONTINUED | OUTPATIENT
Start: 2025-08-12 | End: 2025-08-12

## 2025-08-12 RX ORDER — OXYCODONE HYDROCHLORIDE 30 MG/1
1 TABLET ORAL
Qty: 30 | Refills: 0
Start: 2025-08-12 | End: 2025-08-16

## 2025-08-12 RX ORDER — MELOXICAM 15 MG/1
1 TABLET ORAL
Refills: 0 | DISCHARGE

## 2025-08-12 RX ORDER — ONDANSETRON HCL/PF 4 MG/2 ML
4 VIAL (ML) INJECTION ONCE
Refills: 0 | Status: DISCONTINUED | OUTPATIENT
Start: 2025-08-12 | End: 2025-08-12

## 2025-08-12 RX ORDER — ROSUVASTATIN CALCIUM 20 MG/1
1 TABLET, FILM COATED ORAL
Refills: 0 | DISCHARGE

## 2025-08-12 RX ORDER — METOPROLOL SUCCINATE 50 MG/1
1.5 TABLET, EXTENDED RELEASE ORAL
Refills: 0 | DISCHARGE

## 2025-08-12 RX ORDER — MECLIZINE HCL 12.5 MG
1 TABLET ORAL
Refills: 0 | DISCHARGE

## 2025-08-12 RX ORDER — HYDROMORPHONE/SOD CHLOR,ISO/PF 2 MG/10 ML
0.25 SYRINGE (ML) INJECTION
Refills: 0 | Status: DISCONTINUED | OUTPATIENT
Start: 2025-08-12 | End: 2025-08-12

## 2025-08-13 PROBLEM — Z87.898 PERSONAL HISTORY OF OTHER SPECIFIED CONDITIONS: Chronic | Status: ACTIVE | Noted: 2025-08-08

## 2025-08-14 ENCOUNTER — APPOINTMENT (OUTPATIENT)
Dept: ORTHOPEDIC SURGERY | Facility: CLINIC | Age: 80
End: 2025-08-14
Payer: MEDICARE

## 2025-08-14 VITALS — HEIGHT: 63 IN | WEIGHT: 144 LBS | BODY MASS INDEX: 25.52 KG/M2

## 2025-08-14 VITALS — DIASTOLIC BLOOD PRESSURE: 69 MMHG | HEART RATE: 76 BPM | SYSTOLIC BLOOD PRESSURE: 104 MMHG

## 2025-08-14 DIAGNOSIS — S42.401A UNSPECIFIED FRACTURE OF LOWER END OF RIGHT HUMERUS, INITIAL ENCOUNTER FOR CLOSED FRACTURE: ICD-10-CM

## 2025-08-14 DIAGNOSIS — S52.501A UNSPECIFIED FRACTURE OF THE LOWER END OF RIGHT RADIUS, INITIAL ENCOUNTER FOR CLOSED FRACTURE: ICD-10-CM

## 2025-08-14 DIAGNOSIS — S42.201A UNSPECIFIED FRACTURE OF UPPER END OF RIGHT HUMERUS, INITIAL ENCOUNTER FOR CLOSED FRACTURE: ICD-10-CM

## 2025-08-14 PROCEDURE — 99024 POSTOP FOLLOW-UP VISIT: CPT

## 2025-08-27 ENCOUNTER — APPOINTMENT (OUTPATIENT)
Dept: ORTHOPEDIC SURGERY | Facility: CLINIC | Age: 80
End: 2025-08-27

## 2025-08-27 VITALS — BODY MASS INDEX: 25.52 KG/M2 | HEIGHT: 63 IN | WEIGHT: 144 LBS

## 2025-08-27 DIAGNOSIS — S52.501A UNSPECIFIED FRACTURE OF THE LOWER END OF RIGHT RADIUS, INITIAL ENCOUNTER FOR CLOSED FRACTURE: ICD-10-CM

## 2025-08-27 DIAGNOSIS — S42.201A UNSPECIFIED FRACTURE OF UPPER END OF RIGHT HUMERUS, INITIAL ENCOUNTER FOR CLOSED FRACTURE: ICD-10-CM

## 2025-08-27 DIAGNOSIS — S42.401A UNSPECIFIED FRACTURE OF LOWER END OF RIGHT HUMERUS, INITIAL ENCOUNTER FOR CLOSED FRACTURE: ICD-10-CM

## 2025-08-27 PROBLEM — I10 ESSENTIAL (PRIMARY) HYPERTENSION: Chronic | Status: ACTIVE | Noted: 2025-08-08

## 2025-08-27 PROCEDURE — 99024 POSTOP FOLLOW-UP VISIT: CPT

## 2025-08-28 PROBLEM — E78.5 HYPERLIPIDEMIA, UNSPECIFIED: Chronic | Status: ACTIVE | Noted: 2025-08-08

## (undated) DEVICE — SOL IRR POUR NS 0.9% 500ML

## (undated) DEVICE — WARMING BLANKET LOWER ADULT

## (undated) DEVICE — TAPE SILK 3"

## (undated) DEVICE — VENODYNE/SCD SLEEVE CALF MEDIUM

## (undated) DEVICE — SLING ARM CHIEFTAIN MESH MEDIUM

## (undated) DEVICE — DRAPE TOWEL BLUE 17" X 24"

## (undated) DEVICE — PACK EXTREMITY

## (undated) DEVICE — SPECIMEN CONTAINER 100ML

## (undated) DEVICE — DRILL BIT MEDARTIS TWIST 2X40X91MM

## (undated) DEVICE — DRAPE C ARM C-ARMOUR

## (undated) DEVICE — POSITIONER FOAM EGG CRATE ULNAR 2PCS (PINK)

## (undated) DEVICE — DRAPE HAND 77" X 146"

## (undated) DEVICE — Device

## (undated) DEVICE — DRAPE 3/4 SHEET 52X76"

## (undated) DEVICE — SOL IRR POUR H2O 250ML

## (undated) DEVICE — DRAPE C ARM UNIVERSAL

## (undated) DEVICE — DRILL BIT SYNTHES ORTHO 2.0MM W DEPTH MARK QC 110MM

## (undated) DEVICE — VESSEL LOOP MAXI-BLUE 0.120" X 16"

## (undated) DEVICE — STAPLER SKIN VISI-STAT 35 WIDE

## (undated) DEVICE — SUT POLYSORB 0 30" GS-21 UNDYED

## (undated) DEVICE — DRSG AQUACEL 3.5 X 10"

## (undated) DEVICE — DRSG DERMABOND 0.7ML

## (undated) DEVICE — POSITIONER FOAM HEADREST (PINK)

## (undated) DEVICE — DRAPE U (CLEAR) 47 X 51" NON STERILE

## (undated) DEVICE — DRSG ACE BANDAGE 4" NS

## (undated) DEVICE — SUT POLYSORB 2-0 30" GS-21 UNDYED

## (undated) DEVICE — GOWN TRIMAX LG

## (undated) DEVICE — DRAPE MAYO STAND 30"

## (undated) DEVICE — DRILL BIT SYNTHES ORTHO QC 2.5X110MM

## (undated) DEVICE — DRILL BIT SYNTHES ORTHO CALIBRATED 2.8MM

## (undated) DEVICE — DRSG WEBRIL 4"

## (undated) DEVICE — VESSEL LOOP MINI-BLUE 0.075" X 16"

## (undated) DEVICE — DRAIN PENROSE .25" X 18" LATEX

## (undated) DEVICE — SUT VICRYL PLUS 2-0 36" CT UNDYED

## (undated) DEVICE — DRAPE IOBAN 23" X 23"

## (undated) DEVICE — ELCTR BOVIE PENCIL SMOKE EVACUATION

## (undated) DEVICE — DRSG STOCKINETTE IMPERVIOUS MED 8 X 38"

## (undated) DEVICE — DRAPE SPLIT SHEET 77" X 108"

## (undated) DEVICE — SUT MONOCRYL 3-0 18" PS-2 UNDYED

## (undated) DEVICE — GLV 8 PROTEXIS (BLUE)

## (undated) DEVICE — DRSG STERISTRIPS 0.5 X 4"